# Patient Record
(demographics unavailable — no encounter records)

---

## 2025-07-25 NOTE — ASSESSMENT
[FreeTextEntry1] : 71 M   CAD - calcium score 62 HLD - Strong Fhx CAD  EKG sinus gely rate 50 no st-t changes  TTE unremarkable  - continue lipitor 20mg daily, LDL down to 50, minimally elevated CK, normal LFTs. He is asymptomatic.  CK had been similarly elevated pre statin.  Likely component of his intensive exercise routine, weight training.  He is not doing as intensive training.  Repeat labs today - no signs of heart failure or ischemia, he is active but does not do significant aerobic exercise.  Obtain treadmill stress.

## 2025-07-25 NOTE — HISTORY OF PRESENT ILLNESS
[FreeTextEntry1] : 71M here today for initial visit.  No cp or sob.  Goes for 7-10 miles hikes no complaints besides joint/arthritis related issues.  Notes his endurance isnt as good as it used to be but that there has been no significant or new drop off in his ability to perform his ADLs/exercise.  No palps/dizziness/edema.   He is concerned about hidden heart disease and hears stories about people who had SCD or needed emergency coronary interventions that were previously "healthy"   7/16/24:   returns post testing, started on lipitor since last visit, tolerating well no complaints.   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  stays active, mild weight training.  no chest pain or sob  Non smoker Fhx: Mother CAD/MI in 50s